# Patient Record
Sex: FEMALE | Race: WHITE
[De-identification: names, ages, dates, MRNs, and addresses within clinical notes are randomized per-mention and may not be internally consistent; named-entity substitution may affect disease eponyms.]

---

## 2017-05-24 ENCOUNTER — HOSPITAL ENCOUNTER (EMERGENCY)
Dept: HOSPITAL 17 - NEPD | Age: 27
Discharge: HOME | End: 2017-05-24
Payer: MEDICAID

## 2017-05-24 VITALS — WEIGHT: 88.18 LBS | HEIGHT: 64 IN | BODY MASS INDEX: 15.06 KG/M2

## 2017-05-24 VITALS
RESPIRATION RATE: 16 BRPM | SYSTOLIC BLOOD PRESSURE: 111 MMHG | TEMPERATURE: 97.9 F | HEART RATE: 88 BPM | OXYGEN SATURATION: 98 % | DIASTOLIC BLOOD PRESSURE: 78 MMHG
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
## 2017-05-24 DIAGNOSIS — S32.000A: Primary | ICD-10-CM
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
## 2017-05-24 PROCEDURE — L0200 CERV COL SUPP ADJ BAR & THOR: HCPCS

## 2017-05-24 PROCEDURE — 99284 EMERGENCY DEPT VISIT MOD MDM: CPT

## 2017-05-24 PROCEDURE — L0484 TLSO RIGID PLASTIC CUST FAB: HCPCS

## 2017-05-24 PROCEDURE — 96372 THER/PROPH/DIAG INJ SC/IM: CPT

## 2017-05-24 PROCEDURE — 72072 X-RAY EXAM THORAC SPINE 3VWS: CPT

## 2017-05-24 NOTE — PD
HPI


.


lower back pain since yesterday


Chief Complaint:  Injury


Time Seen by Provider:  08:52


Travel History


International Travel<30 days:  No


Contact w/Intl Traveler<30days:  No


Traveled to known affect area:  No





History of Present Illness


HPI


26-year-old female here with complaints of lower back pain status post fall 

yesterday while at the beach.  Patient says she was trying to walk the beach 

when she actually fell down and hit her lower back.  She is now complaining of 

pain and the end of the T-spine early L spine.  She says the pain is sharp/

aching and rated as 8/10.  She has not tried any over the counter medications.  

She she denies any bowel or bladder dysfunction.  She has no saddle anesthesia.

  She is accompanied by her significant other Remi





[facility]


Past Medical History


Medical History:  Denies Significant Hx


Pregnant?:  Unknown


LMP:  17


:  2


Para:  2


Miscarriage:  0


:  0





Social History


Alcohol Use:  Yes


Tobacco Use:  Yes (1 cig/daily)


Substance Use:  No





Allergies-Medications


(Allergen,Severity, Reaction):  


Coded Allergies:  


     No Known Allergies (Verified , 14)


Reported Meds & Prescriptions





Reported Meds & Active Scripts


Active


Percocet (Oxycodone-Acetaminophen) 5-325 mg Tab 1-2 Tab PO Q6H PRN








Review of Systems


General / Constitutional:  No: Fever


Eyes:  No: Visual changes


HENT:  No: Headaches


Cardiovascular:  No: Chest Pain or Discomfort


Respiratory:  No: Shortness of Breath


Gastrointestinal:  No: Abdominal Pain


Genitourinary:  No: Dysuria


Musculoskeletal:  Positive: Pain (low back pain)


Skin:  No Rash


Neurologic:  No: Weakness


Psychiatric:  No: Depression


Endocrine:  No: Polydipsia


Hematologic/Lymphatic:  No: Easy Bruising





Physical Exam


Narrative


GENERAL: AAO x 3, no acute distress, Well-nourished, well-developed patient.


SKIN: Warm and dry. No visible rashes or bruising. no visible bruising 


HEAD: Normocephalic and atraumatic.


EYES: No scleral icterus. No injection or drainage. 


ENT: No nasal drainage noted. Mucous membranes pink. Airway patent. 


NECK: Supple, trachea midline. No JVD.


CARDIOVASCULAR: Regular rate and rhythm without murmurs, gallops, or rubs. 


RESPIRATORY: Breath sounds equal bilaterally. No accessory muscle use. No 

rhonchi or rales. 


GASTROINTESTINAL: Abdomen soft, non-tender, nondistended. 


EXTREMITIES: No cyanosis or edema. SLR negative b/l


BACK: No obvious deformity. No CVA tenderness. There is tenderness at the end 

of t spine and early l spine. No paraspinal tenderness.   


NEURO: CN II through XII grossly intact.  Strength is normal bilaterally in the 

lower extremities.


PSYCH: AAO x 3, normal affect.





Data


Data


Last Documented VS





Vital Signs








  Date Time  Temp Pulse Resp B/P Pulse Ox O2 Delivery O2 Flow Rate FiO2


 


17 08:38 97.9 88 16 111/78 98 Room Air  








Orders





 Ketorolac Inj (Toradol Inj) (17 09:00)


Spine, Thoracic-Ap/Lat/Sw(3vw) (17 08:54)


[facility] Request For Service (17 10:11)


Oxycodone-Acetamin 5-325 Mg (Percocet (17 10:15)








MDM


Medical Decision Making


Medical Screen Exam Complete:  Yes


Emergency Medical Condition:  Yes


Medical Record Reviewed:  Yes


Differential Diagnosis


Lumbago, spinal fracture, less likely cauda equina


Narrative Course


This is a 26-year-old female presenting status post fall now with pain in the 

end of the T-spine early L spine.


There is some significant spinal process tenderness on examination.


I will go ahead and check an x-ray to rule out any type of bony abnormality.





Last Impressions








Thoracic Spine X-Ray [id number] Signed





Impressions: 





 Service Date/Time:  Wednesday, May 24, 2017 09:14 - CONCLUSION: L1 compression 





 fracture.     Brandin Ortiz MD 





At 9:40 AM call was placed to interventional radiology to see if they would be 

able to perform a kyphoplasty.


Otherwise patient will be discharged to [facility] brace and follow-up as an 

outpatient.


discussed  with Dr. MOOK Borrego. He will determine the disposition. 


1058: spoke with Dr. Lin; he recommends discharge and conservative 

management. 


I discussed with the patient and recommended outpatient f/u with her PCP. 





Patient verbalized understanding of instructions, questions were answered, and 

thanked me for their care. I advised them if their condition worsens, please 

return to the nearest emergency room for further care.





Diagnosis





 Primary Impression:  


 Compression fracture of lumbar vertebra


 Qualified Code:  S32.000A - Compression fracture of lumbar vertebra, closed, 

initial encounter


Patient Instructions:  General Instructions, Vertebral Compression Fracture (ED)





***Additional Instructions:


Please follow-up with your primary care provider for further workup and 

treatment.





Please return to emergency department if your symptoms return or worsen. 


Follow up with your primary care provider. 


Take medications as prescribed.


***Med/Other Pt SpecificInfo:  Prescription(s) given


Scripts


Oxycodone-Acetaminophen (Percocet)5-325 mg Tab1-2 Tab PO Q6H PRN (PAIN) #30 TAB

  Ref 0


   Prov:Brett Borrego MD         17


Disposition:  01 DISCHARGE HOME


Condition:  Stable








Milla Parker May 24, 2017 08:52

## 2017-05-24 NOTE — RADRPT
EXAM DATE/TIME:  05/24/2017 09:14 

 

[facility] COMPARISON:     

No previous studies available for comparison.

 

                     

INDICATIONS :     

Fell down stairs last night, pain mid-right upper back.

                     

 

MEDICAL HISTORY :     

None.          

 

SURGICAL HISTORY :     

None.   

 

ENCOUNTER:     

Initial                                        

 

ACUITY:     

2 days      

 

PAIN SCORE:     

9/10

 

LOCATION:     

Right  thoracic spine.

 

FINDINGS:     

 

 

Slight dextroscoliosis centered at T7. There is a mild superior endplate compression fracture of the 
L1 vertebral body level with mild loss of vertebral body height. Normal bone density.CONCLUSION:     
L1 compression fracture.

 

 

 

 Brandin Ortiz MD on May 24, 2017 at 9:16           

Board Certified Radiologist.

 This report was verified electronically.

## 2017-05-24 NOTE — PD
Data


Data


Last Documented VS





Vital Signs








  Date Time  Temp Pulse Resp B/P Pulse Ox O2 Delivery O2 Flow Rate FiO2


 


5/24/17 08:38 97.9 88 16 111/78 98 Room Air  








Orders





 Ketorolac Inj (Toradol Inj) (5/24/17 09:00)


Spine, Thoracic-Ap/Lat/Sw(3vw) (5/24/17 08:54)


[facility] Request For Service (5/24/17 10:11)


Oxycodone-Acetamin 5-325 Mg (Percocet (5/24/17 10:15)








MDM


Supervised Visit with CHELSEY:  Yes


Narrative Course


The history, exam, and medical decision-making in the associated mid-level 

provider note were completed with my assistance. I reviewed and agree with the 

findings presented.  I attest that I had a face-to-face encounter with the 

patient on the same day, and personally performed and documented my assessment 

and findings in the medical record. 





*My assessment and Findings:





Is a 26 year-old woman presents emergent breath or fall.  She is now one 

compression fracture minimal compression deformity.  She'll need TLSO brace, 

pain medicine, outpatient follow-up.


Diagnosis





 Primary Impression:  


 Compression fracture of lumbar vertebra


 Qualified Code:  S32.000A - Compression fracture of lumbar vertebra, closed, 

initial encounter


Patient Instructions:  General Instructions, Vertebral Compression Fracture (ED)


Departure Forms:  Tests/Procedures





***Additional Instruction:


Please follow-up with your primary care provider for further workup and 

treatment.





Please return to emergency department if your symptoms return or worsen. 


Follow up with your primary care provider. 


Take medications as prescribed.


Scripts


Oxycodone-Acetaminophen (Percocet)5-325 mg Tab1-2 Tab PO Q6H PRN (PAIN) #30 TAB

  Ref 0


   Prov:Brett Borrego MD         5/24/17


Disposition:  01 DISCHARGE HOME


Condition:  Stable








Brett Borrego MD May 24, 2017 11:21

## 2018-03-08 ENCOUNTER — HOSPITAL ENCOUNTER (INPATIENT)
Dept: HOSPITAL 17 - HOBED | Age: 28
LOS: 1 days | Discharge: HOME | DRG: 781 | End: 2018-03-09
Attending: OBSTETRICS & GYNECOLOGY | Admitting: OBSTETRICS & GYNECOLOGY
Payer: MEDICAID

## 2018-03-08 ENCOUNTER — HOSPITAL ENCOUNTER (EMERGENCY)
Dept: HOSPITAL 17 - HOBED | Age: 28
Discharge: LEFT BEFORE DISCHARGE | End: 2018-03-08
Payer: MEDICAID

## 2018-03-08 VITALS — DIASTOLIC BLOOD PRESSURE: 50 MMHG | HEART RATE: 85 BPM | SYSTOLIC BLOOD PRESSURE: 93 MMHG

## 2018-03-08 VITALS — BODY MASS INDEX: 19.63 KG/M2 | WEIGHT: 115 LBS | HEIGHT: 64 IN

## 2018-03-08 VITALS — RESPIRATION RATE: 16 BRPM

## 2018-03-08 DIAGNOSIS — O23.03: Primary | ICD-10-CM

## 2018-03-08 DIAGNOSIS — B96.20: ICD-10-CM

## 2018-03-08 DIAGNOSIS — Z3A.31: ICD-10-CM

## 2018-03-08 DIAGNOSIS — Z53.20: ICD-10-CM

## 2018-03-08 DIAGNOSIS — O99.333: ICD-10-CM

## 2018-03-08 DIAGNOSIS — O47.03: ICD-10-CM

## 2018-03-08 DIAGNOSIS — N12: ICD-10-CM

## 2018-03-08 LAB
BACTERIA #/AREA URNS HPF: (no result) /HPF
BASOPHILS # BLD AUTO: 0 TH/MM3 (ref 0–0.2)
BASOPHILS NFR BLD: 0.3 % (ref 0–2)
BUN SERPL-MCNC: 7 MG/DL (ref 7–18)
CALCIUM SERPL-MCNC: 8.1 MG/DL (ref 8.5–10.1)
CHLORIDE SERPL-SCNC: 104 MEQ/L (ref 98–107)
COLOR UR: (no result)
CREAT SERPL-MCNC: 0.51 MG/DL (ref 0.5–1)
EOSINOPHIL # BLD: 0.1 TH/MM3 (ref 0–0.4)
EOSINOPHIL NFR BLD: 0.7 % (ref 0–4)
ERYTHROCYTE [DISTWIDTH] IN BLOOD BY AUTOMATED COUNT: 13.4 % (ref 11.6–17.2)
GFR SERPLBLD BASED ON 1.73 SQ M-ARVRAT: 143 ML/MIN (ref 89–?)
GLUCOSE SERPL-MCNC: 126 MG/DL (ref 74–106)
GLUCOSE UR STRIP-MCNC: (no result) MG/DL
HCO3 BLD-SCNC: 25.5 MEQ/L (ref 21–32)
HCT VFR BLD CALC: 32.2 % (ref 35–46)
HGB BLD-MCNC: 11.1 GM/DL (ref 11.6–15.3)
HGB UR QL STRIP: (no result)
HYALINE CASTS #/AREA URNS LPF: 2 /LPF
KETONES UR STRIP-MCNC: (no result) MG/DL
LYMPHOCYTES # BLD AUTO: 2.1 TH/MM3 (ref 1–4.8)
LYMPHOCYTES NFR BLD AUTO: 17.1 % (ref 9–44)
MCH RBC QN AUTO: 28.7 PG (ref 27–34)
MCHC RBC AUTO-ENTMCNC: 34.6 % (ref 32–36)
MCV RBC AUTO: 83.1 FL (ref 80–100)
MONOCYTE #: 1 TH/MM3 (ref 0–0.9)
MONOCYTES NFR BLD: 8.2 % (ref 0–8)
MUCOUS THREADS #/AREA URNS LPF: (no result) /LPF
NEUTROPHILS # BLD AUTO: 8.9 TH/MM3 (ref 1.8–7.7)
NEUTROPHILS NFR BLD AUTO: 73.7 % (ref 16–70)
NITRITE UR QL STRIP: (no result)
PLATELET # BLD: 274 TH/MM3 (ref 150–450)
PMV BLD AUTO: 7.1 FL (ref 7–11)
RBC # BLD AUTO: 3.88 MIL/MM3 (ref 4–5.3)
SODIUM SERPL-SCNC: 137 MEQ/L (ref 136–145)
SP GR UR STRIP: 1 (ref 1–1.03)
SQUAMOUS #/AREA URNS HPF: <1 /HPF (ref 0–5)
URINE LEUKOCYTE ESTERASE: (no result)
WBC # BLD AUTO: 12 TH/MM3 (ref 4–11)
WHITE BLOOD CELL CLUMPS: (no result)

## 2018-03-08 PROCEDURE — 99283 EMERGENCY DEPT VISIT LOW MDM: CPT

## 2018-03-08 PROCEDURE — 87086 URINE CULTURE/COLONY COUNT: CPT

## 2018-03-08 PROCEDURE — 87186 SC STD MICRODIL/AGAR DIL: CPT

## 2018-03-08 PROCEDURE — 80048 BASIC METABOLIC PNL TOTAL CA: CPT

## 2018-03-08 PROCEDURE — 85025 COMPLETE CBC W/AUTO DIFF WBC: CPT

## 2018-03-08 PROCEDURE — 82731 ASSAY OF FETAL FIBRONECTIN: CPT

## 2018-03-08 PROCEDURE — 87077 CULTURE AEROBIC IDENTIFY: CPT

## 2018-03-08 PROCEDURE — 80307 DRUG TEST PRSMV CHEM ANLYZR: CPT

## 2018-03-08 PROCEDURE — 59025 FETAL NON-STRESS TEST: CPT

## 2018-03-08 PROCEDURE — G0481 DRUG TEST DEF 8-14 CLASSES: HCPCS

## 2018-03-08 PROCEDURE — 81001 URINALYSIS AUTO W/SCOPE: CPT

## 2018-03-08 RX ADMIN — PHENYTOIN SODIUM SCH MLS/HR: 50 INJECTION INTRAMUSCULAR; INTRAVENOUS at 18:33

## 2018-03-08 RX ADMIN — PHENYTOIN SODIUM SCH MLS/HR: 50 INJECTION INTRAMUSCULAR; INTRAVENOUS at 11:49

## 2018-03-08 RX ADMIN — SODIUM CHLORIDE SCH MLS/HR: 900 INJECTION INTRAVENOUS at 12:43

## 2018-03-08 NOTE — HHI.HP
History & Physical


H&P


 HPI 


HPI


Travel History


International Travel<30 Days:  No


Contact w/Intl Traveler<30Days:  No





History of Present Illness


HPI


Patient is a 27-year-old  at 31/0 who presents today for back pain.  She 

came to the ED approximately 10 hours prior, was diagnosed with a likely 

pyelonephritis, and left AMA prior to treatment because she did not have her 

phone and needed to go home to her children.  She states that she continues to 

have back pain at this time, however is improved from yesterday.  She notes 

that her urine is significantly darker today, and has an abnormal smell, 

however she has had nothing to drink today.  She reports that yesterday she had 

funmilayo blood in her urine, however she believes that she cannot tell if there is 

blood in her urine today.  Dysuria has improved.  Increase frequency.  Reports 

that she had contractions yesterday, which were minor, today she reports that 

they resolved after she went home.  Reports normal fetal movement.  Denies 

large gushes of fluid, discharge of abnormal color/smell, bloody discharge.  

Denies nausea, vomiting, fever, chills, chest pain, shortness of breath, 

headache, change in vision. No other complaints today.


Weeks Gestation:  31


Para:  3


:  4


Miscarriage:  0


:  0





 History (Limited) 


History


Past Medical History


Medical History:  Denies Significant Hx





Past Surgical History


Surgical History:  No Previous Surgery





Family History


Family History:  Negative





Social History


Alcohol Use:  No


Tobacco Use:  Yes (1/4 packs per day)


Substance Abuse:  No





 Allergies-Medications 


Allergies-Medications


(Allergen,Severity, Reaction):  


Coded Allergies:  


     No Known Allergies (Verified  Adverse Reaction, Unknown, 18)


Home Meds


Active Scripts


Prenatal Yxm642/Iron/Folic/Om3 (Bal-Care Dha Essential Pack) 27 Mg Iron-1 Mg-

374 Mg Cmbpkgdrcp, 1 TAB PO DAILY, #30 BOTTLE 11 Refills


   Prov:Harmony Lim         17





 TONO 


Review of Systems


General / Constitutional:  No: Fever, Chills


Eyes:  No: Diploplia, Blurred Vision, Visual changes


HENT:  No: Headaches, Vertigo, Lightheadedness


Cardiovascular:  No: Irregular Rhythm, Chest Pain or Discomfort, Palpitations


Respiratory:  No: Cough, Short of Breath, Wheezing


Gastrointestinal:  No: Nausea, Vomiting, Diarrhea, Abdominal Pain, Hematemesis, 

Hematochezia, Constipation, Indigestion


Genitourinary:  Hematuria, No: Urgency, Frequency, Dysuria


Musculoskeletal:  No: Limited ROM, Weakness


Skin:  No Rash, No Itching, No Dryness


Neurologic:  No: Weakness, Dizziness


Psychiatric:  No: Anxiety, Depression


Endocrine:  No: Polydipsia, Polyuria


Hematologic/Lymphatic:  No Easy Bruising, No Lymph Node Enlargement





 Physical Exam 


Physical Exam


Narrative


GENERAL: Well-nourished, well-developed patient.


SKIN: Warm and dry.


HEAD: Normocephalic and atraumatic.


EYES: No scleral icterus. No injection or drainage. 


ENT: No nasal drainage noted. Mucous membranes pink. Airway patent.


NECK: Supple, trachea midline. No JVD.


CARDIOVASCULAR: Regular rate and rhythm without murmurs, gallops, or rubs. 


RESPIRATORY: Breath sounds equal bilaterally. No accessory muscle use.


ABDOMEN/GI: Abdomen soft, non-tender, bowel sounds present, no rebound, no 

guarding 


GENITOURINARY: 


   External Genitalia: intact and normal in appearance


FHT's: 


   Category: 1   


   Baseline: 140   


   Reactive: Yes   


   Variability: Moderate


   Decels: None


EXTREMITIES: No cyanosis or edema.


BACK: Nontender without obvious deformity.  Mild right sided CVA tenderness


NEUROLOGICAL: Awake and alert. Motor and sensory grossly within normal limits. 

Five out of 5 muscle strength in all muscle groups. Normal speech.





 Data 


Vital Signs Reviewed:  Yes





 [facility] 


MDM


Plan


Patient is a 27-year-old  at 31/0 who presents complaining of right back 

pain, blood in urine.  She had left earlier this morning against medical advice 

prior to treatment for a suspected pyelonephritis.  Reports that her urine is 

currently darker, decreased blood in it, back pain is improved from yesterday.  

Contractions last night, resolved since then. UA from previous visit, 10 hours 

ago this morning shows 100 urine protein, moderate occult blood, large LE.  FFN 

negative. 





Pyelonephritis


-Admit for IV antibiotic administration


-Rocephin 1 g every 24 hours


-Currently afebrile, monitor fever status


-IV hydration, continue p.o. hydration


-FHT category 1, reassuring


-Follow-up CBC, BMP





DW Dr. Pradhan


Diagnosis


Diagnosis:  


 Primary Impression:  


 Pyelonephritis affecting pregnancy in third trimester











Sharif Flores MD R1 Mar 8, 2018 12:10

## 2018-03-08 NOTE — PD
HPI


Travel History


International Travel<30 Days:  No


Contact w/Intl Traveler<30Days:  No





History of Present Illness


HPI


Patient is a 27-year-old  at 31/0 who presents today for back pain.  She 

came to the ED approximately 10 hours prior, was diagnosed with a likely 

pyelonephritis, and left AMA prior to treatment because she did not have her 

phone and needed to go home to her children.  She states that she continues to 

have back pain at this time, however is improved from yesterday.  She notes 

that her urine is significantly darker today, and has an abnormal smell, 

however she has had nothing to drink today.  She reports that yesterday she had 

funmilayo blood in her urine, however she believes that she cannot tell if there is 

blood in her urine today.  Dysuria has improved.  Increase frequency.  Reports 

that she had contractions yesterday, which were minor, today she reports that 

they resolved after she went home.  Reports normal fetal movement.  Denies 

large gushes of fluid, discharge of abnormal color/smell, bloody discharge.  

Denies nausea, vomiting, fever, chills, chest pain, shortness of breath, 

headache, change in vision. No other complaints today.


Weeks Gestation:  31


Para:  3


:  4


Miscarriage:  0


:  0





History


Past Medical History


Medical History:  Denies Significant Hx





Past Surgical History


Surgical History:  No Previous Surgery





Family History


Family History:  Negative





Social History


Alcohol Use:  No


Tobacco Use:  Yes (1/4 packs per day)


Substance Abuse:  No





Allergies-Medications


(Allergen,Severity, Reaction):  


Coded Allergies:  


     No Known Allergies (Verified  Adverse Reaction, Unknown, 18)


Home Meds


Active Scripts


Prenatal Bcr077/Iron/Folic/Om3 (Bal-Care Dha Essential Pack) 27 Mg Iron-1 Mg-

374 Mg Cmbpkgdrcp, 1 TAB PO DAILY, #30 BOTTLE 11 Refills


   Prov:Harmony Lim         17





Review of Systems


General / Constitutional:  No: Fever, Chills


Eyes:  No: Diploplia, Blurred Vision, Visual changes


HENT:  No: Headaches, Vertigo, Lightheadedness


Cardiovascular:  No: Irregular Rhythm, Chest Pain or Discomfort, Palpitations


Respiratory:  No: Cough, Short of Breath, Wheezing


Gastrointestinal:  No: Nausea, Vomiting, Diarrhea, Abdominal Pain, Hematemesis, 

Hematochezia, Constipation, Indigestion


Genitourinary:  Hematuria, No: Urgency, Frequency, Dysuria


Musculoskeletal:  No: Limited ROM, Weakness


Skin:  No Rash, No Itching, No Dryness


Neurologic:  No: Weakness, Dizziness


Psychiatric:  No: Anxiety, Depression


Endocrine:  No: Polydipsia, Polyuria


Hematologic/Lymphatic:  No Easy Bruising, No Lymph Node Enlargement





Physical Exam


Narrative


GENERAL: Well-nourished, well-developed patient.


SKIN: Warm and dry.


HEAD: Normocephalic and atraumatic.


EYES: No scleral icterus. No injection or drainage. 


ENT: No nasal drainage noted. Mucous membranes pink. Airway patent.


NECK: Supple, trachea midline. No JVD.


CARDIOVASCULAR: Regular rate and rhythm without murmurs, gallops, or rubs. 


RESPIRATORY: Breath sounds equal bilaterally. No accessory muscle use.


ABDOMEN/GI: Abdomen soft, non-tender, bowel sounds present, no rebound, no 

guarding 


GENITOURINARY: 


   External Genitalia: intact and normal in appearance


FHT's: 


   Category: 1   


   Baseline: 140   


   Reactive: Yes   


   Variability: Moderate


   Decels: None


EXTREMITIES: No cyanosis or edema.


BACK: Nontender without obvious deformity.  Mild right sided CVA tenderness


NEUROLOGICAL: Awake and alert. Motor and sensory grossly within normal limits. 

Five out of 5 muscle strength in all muscle groups. Normal speech.





Data


Data


Vital Signs Reviewed:  Yes





MDM


Plan


Patient is a 27-year-old  at 31/0 who presents complaining of right back 

pain, blood in urine.  She had left earlier this morning against medical advice 

prior to treatment for a suspected pyelonephritis.  Reports that her urine is 

currently darker, decreased blood in it, back pain is improved from yesterday.  

Contractions last night, resolved since then. UA from previous visit, 10 hours 

ago this morning shows 100 urine protein, moderate occult blood, large LE.  FFN 

negative. 





Pyelonephritis


-Admit for IV antibiotic administration


-Rocephin 1 g every 24 hours


-Currently afebrile, monitor fever status


-IV hydration, continue p.o. hydration


-FHT category 1, reassuring


-Follow-up CBC, BMP





DW Dr. Pradhan


Diagnosis


Diagnosis:  


 Primary Impression:  


 Pyelonephritis affecting pregnancy in third trimester











Sharif Flores MD R1 Mar 8, 2018 11:21

## 2018-03-08 NOTE — PD
HPI


Chief Complaint


Back pain and UTI symptoms, blood noted in the urine


Date Seen:  Mar 8, 2018


Time Seen:  01:50


Travel History


International Travel<30 Days:  No


Contact w/Intl Traveler<30Days:  No


Known Affected Area:  No





History of Present Illness


HPI


27-year-old white female  at 31 weeks [facility] but still has only been a couple of times and has not been in a couple 

months who presents complaining of increasing back pain today and she noticed 

blood in her urine and other urinary symptoms such as discomfort frequency etc. 

she denies vaginal bleeding or leakage of fluid.  However she is doug on 

the monitor about every 3-4 minutes that she can feel but they are not 

extremely painful, fetal heart rate tracing is reactive


Weeks Gestation:  31


Para:  3


:  4





History


Obstetric History


Obstetric History


3 vaginal deliveries the earliest one was 36 weeks





Social History


Alcohol Use:  No


Tobacco Use:  Yes


Substance Abuse:  No





Allergies-Medications


(Allergen,Severity, Reaction):  


Coded Allergies:  


     No Known Allergies (Verified  Adverse Reaction, Unknown, 18)


Home Meds


Active Scripts


Prenatal Omr425/Iron/Folic/Om3 (Bal-Care Dha Essential Pack) 27 Mg Iron-1 Mg-

374 Mg Cmbpkgdrcp, 1 TAB PO DAILY, #30 BOTTLE 11 Refills


   Prov:Harmony Lim         17





Review of Systems


General / Constitutional:  No: Fever, Weight Gain, Chills, Other


Eyes:  No: Diploplia, Blurred Vision, Visual changes, Pain, Photophobia


HENT:  No: Headaches, Vertigo, Lightheadedness


Cardiovascular:  No: Irregular Rhythm, Chest Pain or Discomfort, Palpitations, 

Tachycardia, Syncope, Varicosities, Edema, Cyanosis


Respiratory:  No: Cough, Short of Breath, Other


Gastrointestinal:  No: Nausea, Vomiting, Diarrhea


Genitourinary:  Urgency, Frequency, Dysuria, Hematuria, No: Decreased Urinary 

Output, Oliguria


Musculoskeletal:  No: Limited ROM, Weakness, Cramping, Edema, Pain


Skin:  No Rash, No Itching, No Dryness, No Lumps, No Change in Pigmentation, No 

Change in Nails, No Alopecia, No Lesions


Neurologic:  No: Weakness, Dizziness, Syncope, Focal Abnormalities, 

Coordination Problem, Headache, Slurred Speech, Seizures


Psychiatric:  No: Depression, Suicidal Ideations, Homicidal Ideation


Endocrine:  No: Heat Intolerance, Cold Intolerance, Polydipsia, Polyuria, Other





Physical Exam


Narrative


GENERAL: Well-nourished, well-developed patient.


SKIN: Warm and dry.


HEAD: Normocephalic and atraumatic.


EYES: No scleral icterus. No injection or drainage. 


ENT: No nasal drainage noted. Mucous membranes pink. Airway patent.


NECK: Supple, trachea midline. No JVD.


CARDIOVASCULAR: Regular rate and rhythm without murmurs, gallops, or rubs. 


RESPIRATORY: Breath sounds equal bilaterally. No accessory muscle use.


BREASTS: Bilateral exam showed no masses , no retractions, no nipple discharge.


ABDOMEN/GI: Abdomen soft, non-tender, bowel sounds present, no rebound, no 

guarding ,+ CVAT on R


   Gravid to [30-] weeks size


   Fundal Height: [30-]


GENITOURINARY: 


   External Genitalia: intact and normal in appearance


   BUS glands: [-]


   Cervix: [-post]


   Dilatation: [0-]          


   Effacement: [-0]          


   Station: [-3]  


           


   Membranes: [intact  ]


   Uterine Contractions: [-q 3 min]


FHT's: 


   Category: [1-]   


   Baseline: [133-]   


   Reactive: [-R]   


   Variability: [mod-]  


   Decels: [-none]  


EXTREMITIES: No cyanosis or edema.


BACK: Nontender without obvious deformity. + CVA tenderness.on right none on 

left


NEUROLOGICAL: Awake and alert. Motor and sensory grossly within normal limits. 

Five out of 5 muscle strength in all muscle groups. Normal speech.





Data


Data


Orders





 Orders


Vital Signs (Adult) .ON ADMISSION (3/8/18 01:48)


^ Labor Status (3/8/18 01:48)


Urinalysis - C+S If Indicated (3/8/18 01:48)


^ Non Stress Test (3/8/18 01:48)


Fetal Fibronectin (3/8/18 01:48)


Ob/Psych Drug Screen, Urine (3/8/18 01:48)


Labs


Urinalysis on OB ED shows large leukocyte and large blood and urine was sent 

down





 FFN is pending at this time





MDM


Interpretation(s)


Patient is 27-year-old white female  at 31 weeks who presents complaining 

of right back pain and blood in her urine another urinary tract symptoms, her 

urinalysis here on labor and delivery shows large blood and leukocytes, she has 

significant right CVA tenderness, not on the left, she is afebrile at time of 

admission.  States his pain is been developing over the last 1-2 days the blood 

in her urine she noticed a few days ago got better and then this come back today

, fetal heart rate tracing is reactive and she is doug every 3 minutes.  

Fetal fibronectin done is pending at this time the cervix is closed posterior 

and thick


Plan


Plan for this patient is IV antibiotics for likely pyelonephritis, IV pain 

medication, fetal monitoring and check of lab and urine culture as well as her 

right fetal fibronectin and would tocolyse appropriately.  Once again this plan 

is well thought out and was beginning to be implemented when the patient 

decided to leave AMA because she had to go take care of her kids and get her 

telephone so she signed AMA papers and left we encouraged her to come back and 

get therapy or that she could possibly get much more ill with fever labor 

 delivery and even sepsis potentially life-threatening she heard that 

description and left anyway but says she will come back


Diagnosis


Diagnosis:  


 Primary Impression:  


 Pyelonephritis affecting pregnancy in third trimester


 Additional Impression:  


 Threatened premature labor in third trimester


Disposition:  07 AGAINST MEDICAL ADVICE











Yadiel Moreno II, MD Mar 8, 2018 01:59

## 2018-03-09 VITALS — HEART RATE: 70 BPM | DIASTOLIC BLOOD PRESSURE: 56 MMHG | SYSTOLIC BLOOD PRESSURE: 90 MMHG

## 2018-03-09 VITALS — DIASTOLIC BLOOD PRESSURE: 50 MMHG | SYSTOLIC BLOOD PRESSURE: 85 MMHG | HEART RATE: 71 BPM

## 2018-03-09 VITALS — RESPIRATION RATE: 16 BRPM | TEMPERATURE: 97.6 F

## 2018-03-09 VITALS — HEART RATE: 71 BPM | SYSTOLIC BLOOD PRESSURE: 83 MMHG | DIASTOLIC BLOOD PRESSURE: 44 MMHG

## 2018-03-09 VITALS — TEMPERATURE: 98 F

## 2018-03-09 LAB
BASOPHILS # BLD AUTO: 0 TH/MM3 (ref 0–0.2)
BASOPHILS NFR BLD: 0.3 % (ref 0–2)
BUN SERPL-MCNC: 7 MG/DL (ref 7–18)
CALCIUM SERPL-MCNC: 8.1 MG/DL (ref 8.5–10.1)
CHLORIDE SERPL-SCNC: 111 MEQ/L (ref 98–107)
CREAT SERPL-MCNC: 0.44 MG/DL (ref 0.5–1)
EOSINOPHIL # BLD: 0.1 TH/MM3 (ref 0–0.4)
EOSINOPHIL NFR BLD: 1.8 % (ref 0–4)
ERYTHROCYTE [DISTWIDTH] IN BLOOD BY AUTOMATED COUNT: 13.4 % (ref 11.6–17.2)
GFR SERPLBLD BASED ON 1.73 SQ M-ARVRAT: 169 ML/MIN (ref 89–?)
GLUCOSE SERPL-MCNC: 77 MG/DL (ref 74–106)
HCO3 BLD-SCNC: 23.4 MEQ/L (ref 21–32)
HCT VFR BLD CALC: 28.6 % (ref 35–46)
HGB BLD-MCNC: 10 GM/DL (ref 11.6–15.3)
LYMPHOCYTES # BLD AUTO: 2.1 TH/MM3 (ref 1–4.8)
LYMPHOCYTES NFR BLD AUTO: 26.4 % (ref 9–44)
MCH RBC QN AUTO: 29.1 PG (ref 27–34)
MCHC RBC AUTO-ENTMCNC: 35.1 % (ref 32–36)
MCV RBC AUTO: 83.1 FL (ref 80–100)
MONOCYTE #: 0.9 TH/MM3 (ref 0–0.9)
MONOCYTES NFR BLD: 11 % (ref 0–8)
NEUTROPHILS # BLD AUTO: 4.9 TH/MM3 (ref 1.8–7.7)
NEUTROPHILS NFR BLD AUTO: 60.5 % (ref 16–70)
PLATELET # BLD: 246 TH/MM3 (ref 150–450)
PMV BLD AUTO: 6.8 FL (ref 7–11)
RBC # BLD AUTO: 3.44 MIL/MM3 (ref 4–5.3)
SODIUM SERPL-SCNC: 142 MEQ/L (ref 136–145)
WBC # BLD AUTO: 8 TH/MM3 (ref 4–11)

## 2018-03-09 RX ADMIN — PHENYTOIN SODIUM SCH MLS/HR: 50 INJECTION INTRAMUSCULAR; INTRAVENOUS at 05:55

## 2018-03-09 RX ADMIN — SODIUM CHLORIDE SCH MLS/HR: 900 INJECTION INTRAVENOUS at 12:31

## 2018-03-09 NOTE — PD.OB.ANTE
Subjective


Interval History


Seen and examined this morning.  Patient reports she is feeling significantly 

better today.  She notes decreased back pain, decreased urinary symptoms. No 

nausea, vomiting, fever or chills overnight.  No contractions today.  No new 

discharge or large gushes of fluid.  No other complaints today.





Objective


Lab & Micro Results











Test


  3/8/18


12:07 3/8/18


13:25 3/9/18


04:25


 


White Blood Count 12.0 TH/MM3   8.0 TH/MM3 


 


Red Blood Count 3.88 MIL/MM3   3.44 MIL/MM3 


 


Hemoglobin 11.1 GM/DL   10.0 GM/DL 


 


Hematocrit 32.2 %   28.6 % 


 


Mean Corpuscular Volume 83.1 FL   83.1 FL 


 


Mean Corpuscular Hemoglobin 28.7 PG   29.1 PG 


 


Mean Corpuscular Hemoglobin


Concent 34.6 % 


  


  35.1 % 


 


 


Red Cell Distribution Width 13.4 %   13.4 % 


 


Platelet Count 274 TH/MM3   246 TH/MM3 


 


Mean Platelet Volume 7.1 FL   6.8 FL 


 


Neutrophils (%) (Auto) 73.7 %   60.5 % 


 


Lymphocytes (%) (Auto) 17.1 %   26.4 % 


 


Monocytes (%) (Auto) 8.2 %   11.0 % 


 


Eosinophils (%) (Auto) 0.7 %   1.8 % 


 


Basophils (%) (Auto) 0.3 %   0.3 % 


 


Neutrophils # (Auto) 8.9 TH/MM3   4.9 TH/MM3 


 


Lymphocytes # (Auto) 2.1 TH/MM3   2.1 TH/MM3 


 


Monocytes # (Auto) 1.0 TH/MM3   0.9 TH/MM3 


 


Eosinophils # (Auto) 0.1 TH/MM3   0.1 TH/MM3 


 


Basophils # (Auto) 0.0 TH/MM3   0.0 TH/MM3 


 


CBC Comment DIFF FINAL   DIFF FINAL 


 


Differential Comment     


 


Blood Urea Nitrogen  7 MG/DL  7 MG/DL 


 


Creatinine  0.51 MG/DL  0.44 MG/DL 


 


Random Glucose  126 MG/DL  77 MG/DL 


 


Calcium Level  8.1 MG/DL  8.1 MG/DL 


 


Sodium Level  137 MEQ/L  142 MEQ/L 


 


Potassium Level  3.1 MEQ/L  4.0 MEQ/L 


 


Chloride Level  104 MEQ/L  111 MEQ/L 


 


Carbon Dioxide Level  25.5 MEQ/L  23.4 MEQ/L 


 


Anion Gap  8 MEQ/L  8 MEQ/L 


 


Estimat Glomerular Filtration


Rate 


  143 ML/MIN 


  169 ML/MIN 


 








Physical Exam


GENERAL: Well-nourished, well-developed patient.


CARDIOVASCULAR: Regular rate and rhythm without murmurs, gallops, or rubs.


RESPIRATORY: Breath sounds equal bilaterally. No accessory muscle use.


ABDOMEN/GI: Abdomen soft, non-tender.


GENITOURINARY:


FHT's:


  Category: 1


  Baseline: 140


  Reactive: Yes


  Variability: Moderate


  Decels: None


EXTREMITIES: No cyanosis or edema, non-tender, without signs of DVT.





Assessment and Plan


Assessment and Plan


Patient is a 27-year-old  at  who presented complaining of right back 

pain, blood in urine.  She had left earlier prior to admission against medical 

advice prior to treatment for a suspected pyelonephritis.   symptoms 

improving.  Contractions reported resolved. UA  shows 100 urine protein, 

moderate occult blood, large LE.  FFN negative. 





Pyelonephritis


-Rocephin 1 g every 24 hours


-Currently afebrile, monitor fever status


-IV hydration, continue p.o. hydration


-FHT category 1, reassuring


-When discharged will be prescribed Macrobid daily for prophylaxis





DW Dr. Lorne Flores,Sharif MENDOZA MD R1 Mar 9, 2018 08:58

## 2018-03-09 NOTE — HHI.DCPOC
Discharge Care Plan


Diagnosis:  


(1) Pyelonephritis affecting pregnancy in third trimester


Report Symptoms to Your Doctor


-Temperature above 100.5 degrees


-Redness, of incision or excessive or foul smelling drainage


-Unusual pain or calf pain


-Increased vaginal bleeding


-Painful or difficulty urinating


-Feelings of extreme sadness or anxiety after 2 weeks


Goals to Promote Your Health


* To prevent worsening of your condition and complications


* To maintain your health at the optimal level


Directions to Meet Your Goals


*** Take your medications as prescribed


*** Follow your dietary instruction


*** Follow activity as directed


*** Ensure plenty of rest for recovery


*** Drink fluids for hydration








*** Keep your appointments as scheduled


*** Take your immunizations and boosters as scheduled


*** If your symptoms worsen call your PCP, if no PCP go to Urgent Care Center 

or Emergency Room***


*** Smoking is Dangerous to Your Health. Avoid second hand smoke***


***Call the 24-hour crisis hotline for domestic abuse at 1-498.129.6562***











Marino Byrd MD R2 Mar 9, 2018 12:26

## 2018-05-17 ENCOUNTER — HOSPITAL ENCOUNTER (INPATIENT)
Dept: HOSPITAL 17 - H2EA | Age: 28
LOS: 2 days | Discharge: HOME | End: 2018-05-19
Attending: OBSTETRICS & GYNECOLOGY | Admitting: OBSTETRICS & GYNECOLOGY
Payer: MEDICAID

## 2018-05-17 VITALS — DIASTOLIC BLOOD PRESSURE: 63 MMHG | SYSTOLIC BLOOD PRESSURE: 129 MMHG | HEART RATE: 74 BPM

## 2018-05-17 VITALS — HEART RATE: 70 BPM | DIASTOLIC BLOOD PRESSURE: 80 MMHG | SYSTOLIC BLOOD PRESSURE: 124 MMHG

## 2018-05-17 VITALS — SYSTOLIC BLOOD PRESSURE: 128 MMHG | DIASTOLIC BLOOD PRESSURE: 94 MMHG | HEART RATE: 60 BPM

## 2018-05-17 VITALS — HEART RATE: 86 BPM | SYSTOLIC BLOOD PRESSURE: 127 MMHG | DIASTOLIC BLOOD PRESSURE: 68 MMHG

## 2018-05-17 VITALS — HEIGHT: 64 IN | WEIGHT: 129 LBS | BODY MASS INDEX: 22.02 KG/M2

## 2018-05-17 VITALS — HEART RATE: 90 BPM | DIASTOLIC BLOOD PRESSURE: 83 MMHG | SYSTOLIC BLOOD PRESSURE: 118 MMHG

## 2018-05-17 VITALS — SYSTOLIC BLOOD PRESSURE: 119 MMHG | DIASTOLIC BLOOD PRESSURE: 75 MMHG | HEART RATE: 82 BPM

## 2018-05-17 VITALS — HEART RATE: 74 BPM | SYSTOLIC BLOOD PRESSURE: 114 MMHG | DIASTOLIC BLOOD PRESSURE: 83 MMHG

## 2018-05-17 VITALS — SYSTOLIC BLOOD PRESSURE: 94 MMHG | DIASTOLIC BLOOD PRESSURE: 57 MMHG | HEART RATE: 83 BPM

## 2018-05-17 VITALS — SYSTOLIC BLOOD PRESSURE: 114 MMHG | DIASTOLIC BLOOD PRESSURE: 76 MMHG | HEART RATE: 73 BPM

## 2018-05-17 VITALS — RESPIRATION RATE: 18 BRPM

## 2018-05-17 VITALS — DIASTOLIC BLOOD PRESSURE: 47 MMHG | SYSTOLIC BLOOD PRESSURE: 95 MMHG | HEART RATE: 76 BPM

## 2018-05-17 VITALS — DIASTOLIC BLOOD PRESSURE: 66 MMHG | SYSTOLIC BLOOD PRESSURE: 107 MMHG | HEART RATE: 92 BPM

## 2018-05-17 VITALS — DIASTOLIC BLOOD PRESSURE: 77 MMHG | HEART RATE: 56 BPM | SYSTOLIC BLOOD PRESSURE: 113 MMHG

## 2018-05-17 VITALS — HEART RATE: 72 BPM | SYSTOLIC BLOOD PRESSURE: 112 MMHG | DIASTOLIC BLOOD PRESSURE: 77 MMHG

## 2018-05-17 VITALS — RESPIRATION RATE: 17 BRPM

## 2018-05-17 VITALS — SYSTOLIC BLOOD PRESSURE: 122 MMHG | DIASTOLIC BLOOD PRESSURE: 90 MMHG | HEART RATE: 67 BPM

## 2018-05-17 VITALS — SYSTOLIC BLOOD PRESSURE: 74 MMHG | HEART RATE: 140 BPM | DIASTOLIC BLOOD PRESSURE: 45 MMHG

## 2018-05-17 VITALS — HEART RATE: 82 BPM

## 2018-05-17 VITALS — HEART RATE: 79 BPM | DIASTOLIC BLOOD PRESSURE: 87 MMHG | SYSTOLIC BLOOD PRESSURE: 124 MMHG

## 2018-05-17 VITALS — DIASTOLIC BLOOD PRESSURE: 70 MMHG | SYSTOLIC BLOOD PRESSURE: 109 MMHG | HEART RATE: 60 BPM

## 2018-05-17 VITALS
SYSTOLIC BLOOD PRESSURE: 113 MMHG | TEMPERATURE: 97.9 F | RESPIRATION RATE: 18 BRPM | HEART RATE: 76 BPM | DIASTOLIC BLOOD PRESSURE: 69 MMHG

## 2018-05-17 VITALS — SYSTOLIC BLOOD PRESSURE: 114 MMHG | DIASTOLIC BLOOD PRESSURE: 77 MMHG | HEART RATE: 70 BPM

## 2018-05-17 VITALS — DIASTOLIC BLOOD PRESSURE: 93 MMHG | SYSTOLIC BLOOD PRESSURE: 116 MMHG | HEART RATE: 81 BPM

## 2018-05-17 VITALS — HEART RATE: 96 BPM | DIASTOLIC BLOOD PRESSURE: 87 MMHG | SYSTOLIC BLOOD PRESSURE: 138 MMHG | RESPIRATION RATE: 18 BRPM

## 2018-05-17 VITALS — DIASTOLIC BLOOD PRESSURE: 76 MMHG | SYSTOLIC BLOOD PRESSURE: 143 MMHG | HEART RATE: 89 BPM

## 2018-05-17 VITALS — RESPIRATION RATE: 18 BRPM | TEMPERATURE: 97.5 F

## 2018-05-17 VITALS — HEART RATE: 118 BPM | DIASTOLIC BLOOD PRESSURE: 81 MMHG | SYSTOLIC BLOOD PRESSURE: 99 MMHG

## 2018-05-17 VITALS — SYSTOLIC BLOOD PRESSURE: 111 MMHG | DIASTOLIC BLOOD PRESSURE: 69 MMHG | HEART RATE: 85 BPM

## 2018-05-17 VITALS — TEMPERATURE: 97.7 F | HEART RATE: 83 BPM | RESPIRATION RATE: 18 BRPM

## 2018-05-17 VITALS — HEART RATE: 58 BPM | DIASTOLIC BLOOD PRESSURE: 73 MMHG | SYSTOLIC BLOOD PRESSURE: 120 MMHG

## 2018-05-17 VITALS — SYSTOLIC BLOOD PRESSURE: 112 MMHG | DIASTOLIC BLOOD PRESSURE: 66 MMHG | HEART RATE: 56 BPM

## 2018-05-17 VITALS — HEART RATE: 72 BPM

## 2018-05-17 VITALS — SYSTOLIC BLOOD PRESSURE: 128 MMHG | HEART RATE: 89 BPM | DIASTOLIC BLOOD PRESSURE: 71 MMHG

## 2018-05-17 VITALS — DIASTOLIC BLOOD PRESSURE: 74 MMHG | SYSTOLIC BLOOD PRESSURE: 108 MMHG | HEART RATE: 104 BPM

## 2018-05-17 VITALS — DIASTOLIC BLOOD PRESSURE: 72 MMHG | SYSTOLIC BLOOD PRESSURE: 138 MMHG | HEART RATE: 70 BPM

## 2018-05-17 VITALS — HEART RATE: 67 BPM | SYSTOLIC BLOOD PRESSURE: 138 MMHG | DIASTOLIC BLOOD PRESSURE: 92 MMHG

## 2018-05-17 VITALS — SYSTOLIC BLOOD PRESSURE: 108 MMHG | DIASTOLIC BLOOD PRESSURE: 67 MMHG | HEART RATE: 70 BPM

## 2018-05-17 VITALS — DIASTOLIC BLOOD PRESSURE: 82 MMHG | HEART RATE: 69 BPM | SYSTOLIC BLOOD PRESSURE: 114 MMHG

## 2018-05-17 VITALS — HEART RATE: 75 BPM | DIASTOLIC BLOOD PRESSURE: 72 MMHG | SYSTOLIC BLOOD PRESSURE: 115 MMHG

## 2018-05-17 VITALS — RESPIRATION RATE: 18 BRPM | TEMPERATURE: 97.7 F

## 2018-05-17 VITALS — SYSTOLIC BLOOD PRESSURE: 110 MMHG | DIASTOLIC BLOOD PRESSURE: 60 MMHG | HEART RATE: 82 BPM

## 2018-05-17 VITALS — DIASTOLIC BLOOD PRESSURE: 81 MMHG | SYSTOLIC BLOOD PRESSURE: 121 MMHG | HEART RATE: 71 BPM

## 2018-05-17 VITALS — DIASTOLIC BLOOD PRESSURE: 74 MMHG | SYSTOLIC BLOOD PRESSURE: 98 MMHG | HEART RATE: 85 BPM

## 2018-05-17 VITALS — SYSTOLIC BLOOD PRESSURE: 138 MMHG | HEART RATE: 46 BPM | DIASTOLIC BLOOD PRESSURE: 98 MMHG

## 2018-05-17 VITALS — RESPIRATION RATE: 18 BRPM | HEART RATE: 74 BPM

## 2018-05-17 VITALS — HEART RATE: 67 BPM | DIASTOLIC BLOOD PRESSURE: 71 MMHG | SYSTOLIC BLOOD PRESSURE: 127 MMHG

## 2018-05-17 VITALS — HEART RATE: 65 BPM | DIASTOLIC BLOOD PRESSURE: 72 MMHG | SYSTOLIC BLOOD PRESSURE: 123 MMHG

## 2018-05-17 VITALS — HEART RATE: 70 BPM

## 2018-05-17 VITALS — HEART RATE: 80 BPM

## 2018-05-17 VITALS — HEART RATE: 77 BPM

## 2018-05-17 DIAGNOSIS — F17.210: ICD-10-CM

## 2018-05-17 DIAGNOSIS — Z3A.40: ICD-10-CM

## 2018-05-17 LAB
BACTERIA #/AREA URNS HPF: (no result) /HPF
BASOPHILS # BLD AUTO: 0 TH/MM3 (ref 0–0.2)
BASOPHILS NFR BLD: 0.3 % (ref 0–2)
COLOR UR: YELLOW
EOSINOPHIL # BLD: 0.1 TH/MM3 (ref 0–0.4)
EOSINOPHIL NFR BLD: 1.3 % (ref 0–4)
ERYTHROCYTE [DISTWIDTH] IN BLOOD BY AUTOMATED COUNT: 14.4 % (ref 11.6–17.2)
GLUCOSE UR STRIP-MCNC: (no result) MG/DL
HCT VFR BLD CALC: 31.5 % (ref 35–46)
HGB BLD-MCNC: 10.5 GM/DL (ref 11.6–15.3)
HGB UR QL STRIP: (no result)
KETONES UR STRIP-MCNC: (no result) MG/DL
LYMPHOCYTES # BLD AUTO: 2 TH/MM3 (ref 1–4.8)
LYMPHOCYTES NFR BLD AUTO: 22.7 % (ref 9–44)
MCH RBC QN AUTO: 26 PG (ref 27–34)
MCHC RBC AUTO-ENTMCNC: 33.2 % (ref 32–36)
MCV RBC AUTO: 78.4 FL (ref 80–100)
MONOCYTE #: 0.7 TH/MM3 (ref 0–0.9)
MONOCYTES NFR BLD: 7.9 % (ref 0–8)
MUCOUS THREADS #/AREA URNS LPF: (no result) /LPF
NEUTROPHILS # BLD AUTO: 6 TH/MM3 (ref 1.8–7.7)
NEUTROPHILS NFR BLD AUTO: 67.8 % (ref 16–70)
NITRITE UR QL STRIP: (no result)
PLATELET # BLD: 273 TH/MM3 (ref 150–450)
PMV BLD AUTO: 7.7 FL (ref 7–11)
RBC # BLD AUTO: 4.02 MIL/MM3 (ref 4–5.3)
SP GR UR STRIP: 1.02 (ref 1–1.03)
SQUAMOUS #/AREA URNS HPF: 15 /HPF (ref 0–5)
URINE LEUKOCYTE ESTERASE: (no result)
WBC # BLD AUTO: 8.8 TH/MM3 (ref 4–11)

## 2018-05-17 PROCEDURE — 87086 URINE CULTURE/COLONY COUNT: CPT

## 2018-05-17 PROCEDURE — 81001 URINALYSIS AUTO W/SCOPE: CPT

## 2018-05-17 PROCEDURE — 80307 DRUG TEST PRSMV CHEM ANLYZR: CPT

## 2018-05-17 PROCEDURE — 59025 FETAL NON-STRESS TEST: CPT

## 2018-05-17 PROCEDURE — 10907ZC DRAINAGE OF AMNIOTIC FLUID, THERAPEUTIC FROM PRODUCTS OF CONCEPTION, VIA NATURAL OR ARTIFICIAL OPENING: ICD-10-PCS | Performed by: OBSTETRICS & GYNECOLOGY

## 2018-05-17 PROCEDURE — 85025 COMPLETE CBC W/AUTO DIFF WBC: CPT

## 2018-05-17 PROCEDURE — 3E033VJ INTRODUCTION OF OTHER HORMONE INTO PERIPHERAL VEIN, PERCUTANEOUS APPROACH: ICD-10-PCS | Performed by: OBSTETRICS & GYNECOLOGY

## 2018-05-17 RX ADMIN — OXYTOCIN SCH MLS/HR: 10 INJECTION, SOLUTION INTRAMUSCULAR; INTRAVENOUS at 09:45

## 2018-05-17 RX ADMIN — OXYTOCIN SCH MLS/HR: 10 INJECTION, SOLUTION INTRAMUSCULAR; INTRAVENOUS at 18:01

## 2018-05-17 NOTE — PD.OB.DELI
Weeks gestation:  40


Medical induction of labor?:  Yes


Artificial rupture of membrane:  Yes


Anesthesia:  Epidural


Episiotomy:  None


Vaginal Delivery:  Normal, Spontaneous


Presentation:  Occiput anterior


Nuchal Cord:  None


Shoulder Dystocia:  Suprapubic pressure given, Jose maneuver done, Other (

delivery of posterior arm -- total shoulder dystocia 6 seconds)


Infant:  Male


Delivery date:  May 17, 2018


Delivery time:  18:54


One Minute APGAR:  8


Five Minute APGAR:  9


Birth Weight:  pending


Placenta:  Spontaneous delivery, Intact, 3 vessel cord


Laceration:  No lacerations


Estimated blood loss:  100cc











Katherine Gardner MD May 17, 2018 19:11

## 2018-05-17 NOTE — HHI.HP
HPI


Travel History


International Travel<30 Days:  No


Contact w/Intl Traveler<30Days:  No





History of Present Illness


HPI


Patient is a 26 yo F at 40/6 weeks gestation who presented to OB triage for a 

scheduled induction. Denies LOF, vaginal bleeding. Endorses fetal movement and 

mild on and off contractions. Pt is GBS negative. Denies CP, SOB, N/V or LE 

swelling.


Weeks Gestation:  40


Para:  3


:  4


Miscarriage:  0


:  0





History


Past Medical History


Medical History:  Denies Significant Hx





Obstetric History


Obstetric History





SVDx3, no complications with prior pregnancies. Wt of prior infants were 

between 6-7lbs


Pt reports during last pregnancy it took about 6hours for to be ready to 

deliver. 


No complications with current pregnancy


Denies miscarriages or abortions





Past Surgical History


Surgical History:  No Previous Surgery





Family History


Family History:  Negative





Social History


Alcohol Use:  No


Tobacco Use:  Yes (5-7 cig/per day during pregnancy, h/o of smoking since age 

of 11yo)


Substance Abuse:  No





Allergies-Medications


(Allergen,Severity, Reaction):  


Coded Allergies:  


     No Known Allergies (Verified  Adverse Reaction, Unknown, 18)


Home Meds


Active Scripts


Prenatal Cim031/Iron/Folic/Om3 (Bal-Care Dha Essential Pack) 27 Mg Iron-1 Mg-

374 Mg Cmbpkgdrcp, 1 TAB PO DAILY, #30 BOTTLE 11 Refills


   Prov:Harmony Lim         17


Discontinued Scripts


Nitrofurantoin Macrocrystal (Nitrofurantoin Macrocrystal) 100 Mg Cap, 100 MG PO 

BID for Infection, #79 CAP


   Patient to take one capsule twice a day for 9 days.


   Then once each night until completion of pregnancy.


   Prov:Marino Byrd MD R2         3/9/18


Narrative Medication


no medications





Review of Systems


Except as stated in HPI:  all other systems reviewed are Neg (Per HPI)





Physical Exam


Narrative


GENERAL: Well-nourished, well-developed patient.


SKIN: Warm and dry.


HEAD: Normocephalic and atraumatic.


EYES: No scleral icterus. No injection or drainage. 


ENT: No nasal drainage noted. Mucous membranes pink. Airway patent.


NECK: Supple, trachea midline. No JVD.


CARDIOVASCULAR: Regular rate and rhythm without murmurs, gallops, or rubs. 


RESPIRATORY: Breath sounds equal bilaterally. No accessory muscle use.


ABDOMEN/GI: Abdomen soft, non-tender, bowel sounds present, no rebound, no 

guarding 


   Gravid to 40 weeks size


GENITOURINARY: 


   External Genitalia: intact and normal in appearance


   Cervix: posterior


   Dilatation: 3         


   Effacement: 50         


   Station: -2         


   Membranes: intact


   Uterine Contractions: scattered 


FHT's: 


   Category: 1   


   Baseline: 140   


   Reactive: yes   


   Variability:mod  


   Decels: none


EXTREMITIES: No cyanosis or edema.


BACK: Nontender without obvious deformity. No CVA tenderness.


NEUROLOGICAL: Awake and alert. Motor and sensory grossly within normal limits. 

Five out of 5 muscle strength in all muscle groups. Normal speech.





Caprini VTE Risk Assessment


Caprini VTE Risk Assessment:  No/Low Risk (score <= 1)


Caprini Risk Assessment Model











 Point Value = 1          Point Value = 2  Point Value = 3  Point Value = 5


 


Age 41-60


Minor surgery


BMI > 25 kg/m2


Swollen legs


Varicose veins


Pregnancy or postpartum


History of unexplained or recurrent


   spontaneous 


Oral contraceptives or hormone


   replacement


Sepsis (< 1 month)


Serious lung disease, including


   pneumonia (< 1 month)


Abnormal pulmonary function


Acute myocardial infarction


Congestive heart failure (< 1 month)


History of inflammatory bowel disease


Medical patient at bed rest Age 61-74


Arthroscopic surgery


Major open surgery (> 45 min)


Laparoscopic surgery (> 45 min)


Malignancy


Confined to bed (> 72 hours)


Immobilizing plaster cast


Central venous access Age >= 75


History of VTE


Family history of VTE


Factor V Leiden


Prothrombin [id number]


Lupus anticoagulant


Anticardiolipin antibodies


Elevated serum homocysteine


Heparin-induced thrombocytopenia


Other congenital or acquired


   thrombophilia Stroke (< 1 month)


Elective arthroplasty


Hip, pelvis, or leg fracture


Acute spinal cord injury (< 1 month)








Prophylaxis Regimen











   Total Risk


Factor Score Risk Level Prophylaxis Regimen


 


0-1      Low Early ambulation


 


2 Moderate Order ONE of the following:


*Sequential Compression Device (SCD)


*Heparin 5000 units SQ BID


 


3-4 Higher Order ONE of the following medications:


*Heparin 5000 units SQ TID


*Enoxaparin/Lovenox 40 mg SQ daily (WT < 150 kg, CrCl > 30 mL/min)


*Enoxaparin/Lovenox 30 mg SQ daily (WT < 150 kg, CrCl > 10-29 mL/min)


*Enoxaparin/Lovenox 30 mg SQ BID (WT < 150 kg, CrCl > 30 mL/min)


AND/OR


*Sequential Compression Device (SCD)


 


5 or more Highest Order ONE of the following medications:


*Heparin 5000 units SQ TID (Preferred with Epidurals)


*Enoxaparin/Lovenox 40 mg SQ daily (WT < 150 kg, CrCl > 30 mL/min)


*Enoxaparin/Lovenox 30 mg SQ daily (WT < 150 kg, CrCl > 10-29 mL/min)


*Enoxaparin/Lovenox 30 mg SQ BID (WT < 150 kg, CrCl > 30 mL/min)


AND


*Sequential Compression Device (SCD)











Data


Data


Vital Signs Reviewed:  Yes


Orders





 Orders


Admit To Inpatient (18 )


Vital Signs (Adult) .Per protocol (18 10:01)


Fetal Heart (18 10:01)


Amnioinfusion (18 10:01)


Urinary Catheter Management .ONCE (18 10:01)


Diet Liquid (18 Lunch)


Lactated Ringer's 1000 Ml Inj (Lr 1000 M (18 10:01)


Lactated Ringer's 1000 Ml Inj (Lr 1000 M (18 10:01)


Sodium Chlorid 0.9% 500 Ml Inj (Ns 500 M (18 10:15)


Sodium Chlor 0.9% 1000 Ml Inj (Ns 1000 M (18 10:21)


Lidocaine 1% Inj (50 Ml) (Xylocaine 1% I (18 10:15)


Citric Acid-Sodium Citrate Liq (Bicitra (18 10:15)


Fentanyl Inj (Fentanyl Inj) (18 10:15)


Fentanyl Inj (Fentanyl Inj) (18 10:15)


Complete Blood Count With Diff (18 10:01)


Hold Clot (18 10:01)


Abo/Rh Blood Type (18 10:01)


Urinalysis - C+S If Indicated (18 10:01)


Ob/Psych Drug Screen, Urine (18 10:01)


Resp Oxygen Non Rebreathe Mask (18 )


^ Epidural / Intrathecal Infus (18 10:01)


Oxytocin 30 Units-500ml Premix (Pitocin (18 10:15)


Lidocaine 1% Inj (50 Ml) (Xylocaine 1% I (18 10:15)


Light Mineral Oil (Muri-Lube Oil) (18 10:15)


Inpatient Certification (18 )


Specimen To Be Collected PRN (18 10:01)


Specimen To Be Collected PRN (18 10:01)


^ Non Stress Test (18 10:01)


Response To Medication .Post New Med Administration, Reaction (18 10:01)


^ Discontinue Medication (18 10:01)


Oxytocin 30 Units-500ml Premix (Pitocin (18 10:15)


Urine Culture (18 08:14)


Group B Strep:  Negative


Labs





Laboratory Tests








Test


  18


08:14 18


08:46


 


Urine Color YELLOW  


 


Urine Turbidity HAZY  


 


Urine pH 6.0  


 


Urine Specific Gravity 1.025  


 


Urine Protein TRACE  


 


Urine Glucose (UA) NEG  


 


Urine Ketones NEG  


 


Urine Occult Blood NEG  


 


Urine Nitrite NEG  


 


Urine Bilirubin NEG  


 


Urine Urobilinogen LESS THAN 2.0  


 


Urine Leukocyte Esterase LARGE  


 


Urine RBC 1  


 


Urine WBC 9  


 


Urine Squamous Epithelial


Cells 15 


  


 


 


Urine Bacteria MOD  


 


Urine Mucus FEW  


 


Microscopic Urinalysis Comment


  CULTURE


INDICATED 


 


 


White Blood Count  8.8 


 


Red Blood Count  4.02 


 


Hemoglobin  10.5 


 


Hematocrit  31.5 


 


Mean Corpuscular Volume  78.4 


 


Mean Corpuscular Hemoglobin  26.0 


 


Mean Corpuscular Hemoglobin


Concent 


  33.2 


 


 


Red Cell Distribution Width  14.4 


 


Platelet Count  273 


 


Mean Platelet Volume  7.7 


 


Neutrophils (%) (Auto)  67.8 


 


Lymphocytes (%) (Auto)  22.7 


 


Monocytes (%) (Auto)  7.9 


 


Eosinophils (%) (Auto)  1.3 


 


Basophils (%) (Auto)  0.3 


 


Neutrophils # (Auto)  6.0 


 


Lymphocytes # (Auto)  2.0 


 


Monocytes # (Auto)  0.7 


 


Eosinophils # (Auto)  0.1 


 


Basophils # (Auto)  0.0 


 


CBC Comment  DIFF FINAL 


 


Differential Comment   














 Date/Time


Source Procedure


Growth Status


 


 


 18 08:14


Urine Clean Catch Urine Culture


Pending Received











Assessment/Plan


Problem List:  


(1) 40 weeks gestation of pregnancy


ICD Codes:  Z3A.40 - 40 weeks gestation of pregnancy


Plan:  Patient is a 26 yo F at 40/6 weeks gestation who presented to OB triage 

for a scheduled induction.





IUP at 40/6 wks 


1. admit for scheduled induction of labor with pitocin


2. pitocin started at 2:2:30


3. clear liquid diet


4. category 1, NST reassuring 


5. continue to monitor labor progression and vs





DW Dr. Gardner





(2) Elective induction of labor planned


Plan:  see plan above














Jimena Vasquez MD, R1 May 17, 2018 10:40

## 2018-05-18 VITALS
HEART RATE: 55 BPM | RESPIRATION RATE: 18 BRPM | SYSTOLIC BLOOD PRESSURE: 104 MMHG | TEMPERATURE: 98.2 F | DIASTOLIC BLOOD PRESSURE: 69 MMHG

## 2018-05-18 VITALS
TEMPERATURE: 98 F | RESPIRATION RATE: 20 BRPM | SYSTOLIC BLOOD PRESSURE: 98 MMHG | OXYGEN SATURATION: 98 % | DIASTOLIC BLOOD PRESSURE: 69 MMHG | HEART RATE: 63 BPM

## 2018-05-18 RX ADMIN — IBUPROFEN PRN MG: 800 TABLET, FILM COATED ORAL at 16:54

## 2018-05-18 RX ADMIN — ACETAMINOPHEN PRN MG: 325 TABLET ORAL at 09:39

## 2018-05-18 RX ADMIN — ACETAMINOPHEN PRN MG: 325 TABLET ORAL at 16:55

## 2018-05-18 RX ADMIN — IBUPROFEN PRN MG: 800 TABLET, FILM COATED ORAL at 02:24

## 2018-05-18 RX ADMIN — ACETAMINOPHEN PRN MG: 325 TABLET ORAL at 02:24

## 2018-05-18 NOTE — HHI.OB
Subjective


Post Partum Day:  1


Remarks


Patient seen and examined this morning. AFVSS overnight. Postpartum day #1. 

Patient states her pain is well controlled. Decreased lochia. Denies dysuria. 

No breast tenderness. Appetite good. No nausea or vomiting. Ambulating well 

without reported issues. Denies fevers or chills, chest pain, calf pain, 

shortness of breath, or cough. She otherwise has no other complaints or 

concerns this morning.





Objective


Vitals/I&O





Vital Signs








  Date Time  Temp Pulse Resp B/P (MAP) Pulse Ox O2 Delivery O2 Flow Rate FiO2


 


18 08:30  63 20 98/69 (79) 98   


 


18 08:30 98.0       


 


18 21:41 97.9 76 18 113/69 (84)    


 


18 21:08  60  128/94 (105)    


 


18 20:35   18     


 


18 20:30  90  118/83 (95)    


 


18 20:15  70  124/80 (95)    


 


18 20:01  56  113/77 (89)    


 


18 20:00   18     


 


18 19:56  56  112/66 (81)    


 


18 19:45 97.7  18     


 


18 19:45  83      


 


18 19:31  73  114/76 (89)    


 


18 19:23   18     


 


18 19:16  89  128/71 (90)    


 


18 19:01  70  108/67 (81)    


 


18 18:45  79  124/87 (99)    


 


18 18:30  85  111/69 (83)    


 


18 18:15  82  119/75 (90)    


 


18 18:00  85  98/74 (82)    


 


18 17:59   18     


 


18 17:45  92  107/66 (80)    


 


18 17:15  104  108/74 (85)    


 


18 17:05  76  95/47 (63)    


 


18 17:01  118  99/81 (87)    


 


18 16:56  83  94/57 (69)    


 


18 16:51  82  110/60 (77)    


 


5/17/18 16:46   17     


 


5/17/18 16:45  86      


 


17/18 16:45  67  127/68 (87)    


 


/18 16:43   17     


 


17/18 16:41  74  129/63 (85)    


 


/18 16:40  72      


 


17/18 16:39  71  121/81 (94)    


 


17/18 16:38  67  127/71 (89)    


 


18 16:36  140  74/45 (55)    


 


18 16:35  70      


 


17/18 16:30  96      


 


17/18 16:30  91  138/87 (104)    


 


/18 16:30   18     


 


17/18 16:26  89  143/76 (98)    


 


18 16:25  82      


 


17/18 16:23  67  138/92 (107)    


 


18 16:20  77      


 


18 16:15   18     


 


18 16:15  74      


 


18 16:10  80      


 


18 16:08  70  138/72 (94)    


 


18 15:32 97.7  18     


 


/18 15:31  46      


 


17/18 15:31    138/98 (111)    


 


18 15:01  65  123/72 (89)    


 


18 14:31  72  112/77 (89)    


 


18 14:07   18     


 


/18 14:00  67  122/90 (101)    


 


18 13:58   18     


 


/18 13:31  81  116/93 (101)    


 


18 13:15   18     


 


/18 13:00  74  114/83 (93)    


 


18 12:35   18     


 


17/18 12:35 97.5       


 


17/18 12:30  69  114/82 (93)    


 


18 12:15   18     


 


/18 12:01  58  120/73 (89)    


 


/18 11:45   18     


 


17/18 11:31  75  115/72 (86)    


 


/18 11:15   18     


 


17/18 11:00  60  109/70 (83)    


 


18 10:55   18     


 


17/18 10:48  70  114/77 (89)    








Objective Remarks


GENERAL: Well-nourished, well-developed patient.


CARDIOVASCULAR: Regular rate and rhythm without murmurs, gallops, or rubs. 


RESPIRATORY: Breath sounds equal bilaterally. No accessory muscle use.


ABDOMEN/GI: Abdomen soft, non-tender. 


   Fundus: Firm, non-tender at umbilicus.


GENITOURINARY: Light to moderate bleeding.


EXTREMITIES: No cyanosis or edema, non-tender, without signs of DVT.


Medications and IVs





Current Medications








 Medications


  (Trade)  Dose


 Ordered  Sig/Dean


 Route  Start Time


 Stop Time Status Last Admin


 


 Lactated Ringer's  1,000 ml @ 


 125 mls/hr  Q8H


 IV  18 10:01


    18 18:01


 


 


 Lactated Ringer's  1,000 ml @ 


 3,000 mls/hr  Q20M PRN


 IV  18 10:01


     


 


 


 Sodium Chloride  1,000 ml @ 


 100 mls/hr  Q10H PRN


 IV  18 10:21


     


 


 


  (Xylocaine 1%


 Inj (50 ml))  0.1 ml  UNSCH X1  PRN


 I-DERMAL  18 10:15


 18 10:14   


 


 


  (Bicitra Liq)  30 ml  ON  CALL


 PO  18 10:15


 18 10:14   


 


 


  (fentaNYL INJ)  50 mcg  Q1H  PRN


 IV PUSH  18 10:15


     


 


 


  (fentaNYL INJ)  100 mcg  Q1H  PRN


 IV PUSH  18 10:15


     


 


 


  (Xylocaine 1%


 Inj (50 ml))  10 ml  UNSCH X1  PRN


 INFIL  18 10:15


 18 10:14   


 


 


  (Muri-Lube Oil)  10 ml  UNSCH  PRN


 TOPICAL  18 10:15


     


 


 


 Oxytocin  500 ml @ 0


 mls/hr  TITRATE  PRN


 IV  18 10:15


    18 10:15


 


 


  (Misc Nursing


 Information)  No systemic


 narcotics to be


 given except...  UNSCH  PRN


 .XX  18 17:15


 18 17:14   


 


 


  (Misc Nursing


 Information)  DO NOT


 ADMINISTER ANY


 ANTICOAGUL...  UNSCH  PRN


 .XX  18 17:15


 18 17:14   


 


 


 Fentanyl/


 Bupivacaine HCl  100 ml @ 0


 mls/hr  TITRATE  PRN


 EPIDURAL  18 17:15


     


 


 


  (ePHEDrine/NS 25


 MG/5 ML SYR)  10 mg  UNSCH  PRN


 IV PUSH  18 17:15


 18 17:14   


 


 


  (NS Flush)  2 ml  UNSCH  PRN


 IV FLUSH  18 19:15


     


 


 


  (Tylenol)  650 mg  Q4H  PRN


 PO  18 19:15


    18 02:24


 


 


  (Motrin)  800 mg  Q8H  PRN


 PO  18 19:15


    18 02:24


 


 


  (Americaine 20%


 Top Spr)  1 spray  Q4H  PRN


 TOPICAL  18 19:15


    18 02:09


 


 


  (Tucks Pads)  1 applic  QID  PRN


 TOPICAL  18 19:15


    18 02:09


 


 


  (Ritu-Colace)  2 tab  Q12H  PRN


 PO  18 19:15


     


 


 


  (Ambien)  5 mg  HS  PRN


 PO  18 21:00


     


 


 


  (Mag-Al Plus


 Susp Liq)  15 ml  Q8H  PRN


 PO  18 19:15


     


 


 


  (Zofran  Odt)  4 mg  Q6H  PRN


 PO  18 19:15


     


 











Assessment/Plan


Problem List:  


(1) Postpartum care following vaginal delivery


ICD Codes:  Z39.2 - Encounter for routine postpartum follow-up


Assessment and Plan


27 year old now  PPD#1.





1. Postpartum Care


- AFVSS


- Encouraged OOB, as tolerated


- Motrin prn pain


- Advised pelvic rest x 6 weeks


- Contraception: Discussed with patient this morning, patient desiring Depo-

Provera


- Will f/u with OB provider within 6 weeks





wdw OB hospitalist


Discharge Planning


Anticipate discharge home today or tomorrow pending stable clinical course











Thom Najera MD R2 May 18, 2018 08:47

## 2018-05-19 VITALS
DIASTOLIC BLOOD PRESSURE: 64 MMHG | SYSTOLIC BLOOD PRESSURE: 109 MMHG | RESPIRATION RATE: 20 BRPM | OXYGEN SATURATION: 97 % | TEMPERATURE: 97.9 F | HEART RATE: 71 BPM

## 2018-05-19 RX ADMIN — ACETAMINOPHEN PRN MG: 325 TABLET ORAL at 12:31

## 2018-05-19 RX ADMIN — IBUPROFEN PRN MG: 800 TABLET, FILM COATED ORAL at 12:31

## 2018-05-19 NOTE — HHI.OB
Subjective


Post Partum Day:  2


Remarks


Patient seen and examined this morning. AFVSS overnight. Postpartum day #2. 

Patient states her pain is well controlled. Decreased lochia. Denies dysuria. 

No breast tenderness. Appetite good. No nausea or vomiting. Ambulating well. 

Denies fevers or chills, chest pain, calf pain, shortness of breath, cough. She 

otherwise has no complaints or concerns this morning.





Objective


Vitals/I&O





Vital Signs








  Date Time  Temp Pulse Resp B/P (MAP) Pulse Ox O2 Delivery O2 Flow Rate FiO2


 


18 08:00  71      


 


18 08:00 97.9 20 20 109/64 (79) 97   


 


18 20:29 98.2 55 18 104/69 (81)    








Objective Remarks


GENERAL: Well-nourished, well-developed patient.


CARDIOVASCULAR: Regular rate and rhythm without murmurs, gallops, or rubs. 


RESPIRATORY: Breath sounds equal bilaterally. No accessory muscle use.


ABDOMEN/GI: Abdomen soft, non-tender. 


   Fundus: Firm, non-tender at umbilicus.


GENITOURINARY: Light to moderate bleeding.


EXTREMITIES: No cyanosis or edema, non-tender, without signs of DVT.


Medications and IVs





Current Medications








 Medications


  (Trade)  Dose


 Ordered  Sig/Dean


 Route  Start Time


 Stop Time Status Last Admin


 


 Lactated Ringer's  1,000 ml @ 


 125 mls/hr  Q8H


 IV  18 10:01


    18 18:01


 


 


 Lactated Ringer's  1,000 ml @ 


 3,000 mls/hr  Q20M PRN


 IV  18 10:01


     


 


 


 Sodium Chloride  1,000 ml @ 


 100 mls/hr  Q10H PRN


 IV  18 10:21


     


 


 


  (Xylocaine 1%


 Inj (50 ml))  0.1 ml  UNSCH X1  PRN


 I-DERMAL  18 10:15


 18 10:14   


 


 


  (Bicitra Liq)  30 ml  ON  CALL


 PO  18 10:15


 18 10:14   


 


 


  (fentaNYL INJ)  50 mcg  Q1H  PRN


 IV PUSH  18 10:15


     


 


 


  (fentaNYL INJ)  100 mcg  Q1H  PRN


 IV PUSH  18 10:15


     


 


 


  (Xylocaine 1%


 Inj (50 ml))  10 ml  UNSCH X1  PRN


 INFIL  18 10:15


 18 10:14   


 


 


  (Muri-Lube Oil)  10 ml  UNSCH  PRN


 TOPICAL  18 10:15


     


 


 


 Oxytocin  500 ml @ 0


 mls/hr  TITRATE  PRN


 IV  18 10:15


    18 10:15


 


 


 Fentanyl/


 Bupivacaine HCl  100 ml @ 0


 mls/hr  TITRATE  PRN


 EPIDURAL  18 17:15


     


 


 


  (NS Flush)  2 ml  UNSCH  PRN


 IV FLUSH  18 19:15


     


 


 


  (Tylenol)  650 mg  Q4H  PRN


 PO  18 19:15


    18 16:55


 


 


  (Motrin)  800 mg  Q8H  PRN


 PO  18 19:15


    18 16:54


 


 


  (Americaine 20%


 Top Spr)  1 spray  Q4H  PRN


 TOPICAL  18 19:15


    18 02:09


 


 


  (Tucks Pads)  1 applic  QID  PRN


 TOPICAL  18 19:15


    18 02:09


 


 


  (Ritu-Colace)  2 tab  Q12H  PRN


 PO  18 19:15


     


 


 


  (Ambien)  5 mg  HS  PRN


 PO  18 21:00


     


 


 


  (Mag-Al Plus


 Susp Liq)  15 ml  Q8H  PRN


 PO  18 19:15


     


 


 


  (Zofran  Odt)  4 mg  Q6H  PRN


 PO  18 19:15


     


 











Assessment/Plan


Problem List:  


(1) Postpartum care following vaginal delivery


ICD Codes:  Z39.2 - Encounter for routine postpartum follow-up


Assessment and Plan


27 year old now  PPD#2.





1. Postpartum Care


- AFVSS


- Encouraged OOB, as tolerated


- Motrin prn pain


- Advised pelvic rest x 6 weeks


- Contraception: Discussed with patient this morning, patient desiring Depo-

Provera


- Will f/u with OB provider within 6 weeks





christina Zapata


Discharge Planning


Stable for discharge home today











Thom Najera MD R2 May 19, 2018 08:46